# Patient Record
Sex: FEMALE | Race: WHITE
[De-identification: names, ages, dates, MRNs, and addresses within clinical notes are randomized per-mention and may not be internally consistent; named-entity substitution may affect disease eponyms.]

---

## 2017-03-03 ENCOUNTER — HOSPITAL ENCOUNTER (OUTPATIENT)
Dept: HOSPITAL 23 - CECH | Age: 57
Discharge: HOME | End: 2017-03-03
Payer: COMMERCIAL

## 2017-03-03 DIAGNOSIS — J45.30: ICD-10-CM

## 2017-03-03 DIAGNOSIS — I08.3: ICD-10-CM

## 2017-03-03 DIAGNOSIS — I08.0: Primary | ICD-10-CM

## 2017-03-03 DIAGNOSIS — I27.2: ICD-10-CM

## 2018-03-14 ENCOUNTER — OFFICE VISIT (OUTPATIENT)
Dept: RETAIL CLINIC | Facility: CLINIC | Age: 58
End: 2018-03-14

## 2018-03-14 VITALS
RESPIRATION RATE: 16 BRPM | TEMPERATURE: 99.5 F | OXYGEN SATURATION: 97 % | DIASTOLIC BLOOD PRESSURE: 72 MMHG | SYSTOLIC BLOOD PRESSURE: 118 MMHG | HEART RATE: 62 BPM

## 2018-03-14 DIAGNOSIS — K11.20 PAROTITIS: Primary | ICD-10-CM

## 2018-03-14 PROCEDURE — 99203 OFFICE O/P NEW LOW 30 MIN: CPT | Performed by: NURSE PRACTITIONER

## 2018-03-14 RX ORDER — TRAZODONE HYDROCHLORIDE 100 MG/1
100 TABLET ORAL NIGHTLY
COMMUNITY

## 2018-03-14 RX ORDER — CHLORAL HYDRATE 500 MG
CAPSULE ORAL
COMMUNITY

## 2018-03-14 RX ORDER — CITALOPRAM 10 MG/1
10 TABLET ORAL DAILY
COMMUNITY

## 2018-03-14 RX ORDER — BUDESONIDE AND FORMOTEROL FUMARATE DIHYDRATE 160; 4.5 UG/1; UG/1
2 AEROSOL RESPIRATORY (INHALATION)
COMMUNITY

## 2018-03-14 RX ORDER — AMLODIPINE BESYLATE AND BENAZEPRIL HYDROCHLORIDE 10; 20 MG/1; MG/1
1 CAPSULE ORAL DAILY
COMMUNITY

## 2018-03-14 RX ORDER — LEVALBUTEROL INHALATION SOLUTION 0.31 MG/3ML
1 SOLUTION RESPIRATORY (INHALATION) EVERY 4 HOURS PRN
COMMUNITY

## 2018-03-14 RX ORDER — CEPHALEXIN 500 MG/1
500 CAPSULE ORAL 3 TIMES DAILY
Qty: 30 CAPSULE | Refills: 0 | Status: SHIPPED | OUTPATIENT
Start: 2018-03-14 | End: 2018-03-24

## 2018-03-14 RX ORDER — LEVOTHYROXINE SODIUM 0.1 MG/1
100 TABLET ORAL DAILY
COMMUNITY

## 2018-03-14 RX ORDER — LOVASTATIN 10 MG/1
10 TABLET ORAL NIGHTLY
COMMUNITY

## 2018-03-14 RX ORDER — ASPIRIN 325 MG
325 TABLET ORAL DAILY
COMMUNITY

## 2018-03-14 NOTE — PROGRESS NOTES
Subjective   Patient ID: Sheila Jamil is a 58 y.o. female presents with   Chief Complaint   Patient presents with   • Jaw Pain       Patient reports abrupt onset of pain and swelling to left jaw while eating last night.  Patient concerned that she may have mumps.      Jaw Pain   This is a new problem. The current episode started yesterday. The problem occurs constantly. The problem has been unchanged. Associated symptoms include fatigue and swollen glands. Pertinent negatives include no abdominal pain, chest pain, chills, congestion, coughing, diaphoresis, fever, headaches, myalgias, nausea, neck pain, rash, sore throat, urinary symptoms, vomiting or weakness. The symptoms are aggravated by eating. She has tried nothing for the symptoms.       Allergies   Allergen Reactions   • Albuterol Anaphylaxis   • Barium-Containing Compounds Anaphylaxis   • Codeine Other (See Comments)     hyper   • Penicillins Nausea And Vomiting   • Sulfa Antibiotics Other (See Comments)     Get secondary infection         The following portions of the patient's history were reviewed and updated as appropriate: allergies, current medications, past family history, past medical history, past social history, past surgical history and problem list.      Review of Systems   Constitutional: Positive for fatigue. Negative for chills, diaphoresis and fever.   HENT: Positive for facial swelling. Negative for congestion, drooling, ear pain, rhinorrhea, sinus pressure, sore throat, tinnitus, trouble swallowing and voice change.    Respiratory: Negative for cough, chest tightness, shortness of breath, wheezing and stridor.    Cardiovascular: Negative for chest pain.   Gastrointestinal: Negative for abdominal pain, diarrhea, nausea and vomiting.   Musculoskeletal: Negative for myalgias and neck pain.   Skin: Negative for rash.   Neurological: Negative for weakness and headaches.       Objective     Vitals:    03/14/18 1428   BP: 118/72   Pulse: 62    Resp: 16   Temp: 99.5 °F (37.5 °C)   SpO2: 97%         Physical Exam   Constitutional: She is oriented to person, place, and time. She appears well-developed and well-nourished. She does not appear ill. No distress.   HENT:   Head: Normocephalic.   Right Ear: Hearing, tympanic membrane, external ear and ear canal normal.   Left Ear: Hearing, tympanic membrane, external ear and ear canal normal.   Nose: Nose normal. No rhinorrhea or sinus tenderness.   Mouth/Throat: Uvula is midline, oropharynx is clear and moist and mucous membranes are normal. No trismus in the jaw. No uvula swelling. Tonsils are 1+ on the right. Tonsils are 1+ on the left. No tonsillar exudate.   Mild edema to left mandibular angle.  Tender to palpation.  No drooling or pooling of oral secretions.   Eyes: Conjunctivae are normal.   Sclera white.   Neck: Trachea normal and phonation normal. No tracheal deviation present.   Cardiovascular: Normal rate, regular rhythm, S1 normal and S2 normal.    Murmur heard.   Systolic murmur is present with a grade of 2/6   Pulmonary/Chest: Effort normal and breath sounds normal. No accessory muscle usage or stridor. No respiratory distress.   Abdominal: Soft. Bowel sounds are normal. There is no tenderness.   Lymphadenopathy:     She has no cervical adenopathy.   Neurological: She is alert and oriented to person, place, and time.   Skin: Skin is warm and dry.   Vitals reviewed.        Sheila was seen today for jaw pain.    Diagnoses and all orders for this visit:    Parotitis  -     cephalexin (KEFLEX) 500 MG capsule; Take 1 capsule by mouth 3 (Three) Times a Day for 10 days.      Keflex as prescribed.  Push fluids.  Tylenol/Motrin for pain or fever.  To ER for any worsening.  Follow up if no improvement or worsening.  May call health department or urgent care center to inquire about testing for mumps if truly concerned.   Follow-up with Primary Care Physician in 48-72 hours if these symptoms worsen or fail to  improve as anticipated. Patient verbalizes understanding.

## 2018-03-14 NOTE — PATIENT INSTRUCTIONS
Parotitis  Parotitis means that you have irritation and swelling (inflammation) in one or both of your parotid glands. These glands make spit (saliva). They are found on each side of your face, below and in front of your earlobes. You may or may not have pain with this condition.  Follow these instructions at home:  Medicines   · Take over-the-counter and prescription medicines only as told by your doctor.  · If you were prescribed an antibiotic medicine, take it as told by your doctor. Do not stop taking the antibiotic even if you start to feel better.  Managing pain and swelling   · Apply warm cloths (compresses) to the swollen area as told by your doctor.  · Gently rub your parotid glands as told by your doctor.  General instructions     · Drink enough fluid to keep your pee (urine) clear or pale yellow.  · Suck on sour candy. This may help:  ¨ To make your mouth less dry.  ¨ To make more spit.  · Keep your mouth clean and moist.  ¨ Gargle with a salt-water mixture 3-4 times per day, or as needed.  ¨ To make a salt-water mixture, stir ½-1 tsp of salt into 1 cup of warm water.  · Take good care of your mouth:  ¨ Brush your teeth at least two times per day.  ¨ Floss your teeth every day.  ¨ See your dentist regularly.  · Do not use tobacco products. These include cigarettes, chewing tobacco, or e-cigarettes. If you need help quitting,  · ask your doctor.  · Keep all follow-up visits as told by your doctor. This is important.  Contact a doctor if:  · You have a fever or chills.  · You have new symptoms.  · Your symptoms get worse.  · Your symptoms do not get better with treatment.  This information is not intended to replace advice given to you by your health care provider. Make sure you discuss any questions you have with your health care provider.  Document Released: 01/20/2012 Document Revised: 05/25/2017 Document Reviewed: 05/12/2016  [employer] Patient Education © 2017 [employer]    Keflex as  prescribed.  Push fluids.  Tylenol/Motrin for pain or fever.  To ER for any worsening.  Follow up if no improvement or worsening.  May call health department or urgent care center to inquire about testing for mumps if truly concerned.